# Patient Record
Sex: FEMALE | Race: WHITE | NOT HISPANIC OR LATINO | Employment: OTHER | URBAN - METROPOLITAN AREA
[De-identification: names, ages, dates, MRNs, and addresses within clinical notes are randomized per-mention and may not be internally consistent; named-entity substitution may affect disease eponyms.]

---
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
## 2022-04-27 ENCOUNTER — HOSPITAL ENCOUNTER (EMERGENCY)
Facility: CLINIC | Age: 22
Discharge: HOME | End: 2022-04-28
Attending: EMERGENCY MEDICINE | Admitting: EMERGENCY MEDICINE
Payer: COMMERCIAL

## 2022-04-27 DIAGNOSIS — Z72.89 DELIBERATE SELF-CUTTING: ICD-10-CM

## 2022-04-27 LAB — SARS-COV-2 RNA RESP QL NAA+PROBE: NEGATIVE

## 2022-04-27 PROCEDURE — U0003 INFECTIOUS AGENT DETECTION BY NUCLEIC ACID (DNA OR RNA); SEVERE ACUTE RESPIRATORY SYNDROME CORONAVIRUS 2 (SARS-COV-2) (CORONAVIRUS DISEASE [COVID-19]), AMPLIFIED PROBE TECHNIQUE, MAKING USE OF HIGH THROUGHPUT TECHNOLOGIES AS DESCRIBED BY CMS-2020-01-R: HCPCS | Performed by: EMERGENCY MEDICINE

## 2022-04-27 PROCEDURE — C9803 HOPD COVID-19 SPEC COLLECT: HCPCS

## 2022-04-27 PROCEDURE — 99285 EMERGENCY DEPT VISIT HI MDM: CPT | Mod: 25

## 2022-04-27 PROCEDURE — 90791 PSYCH DIAGNOSTIC EVALUATION: CPT

## 2022-04-27 ASSESSMENT — ENCOUNTER SYMPTOMS: SHORTNESS OF BREATH: 0

## 2022-04-28 VITALS
SYSTOLIC BLOOD PRESSURE: 110 MMHG | OXYGEN SATURATION: 98 % | DIASTOLIC BLOOD PRESSURE: 77 MMHG | HEART RATE: 79 BPM | TEMPERATURE: 98.9 F | RESPIRATION RATE: 16 BRPM

## 2022-04-28 PROCEDURE — 250N000013 HC RX MED GY IP 250 OP 250 PS 637: Performed by: PSYCHIATRY & NEUROLOGY

## 2022-04-28 RX ORDER — LAMOTRIGINE 100 MG/1
200 TABLET ORAL DAILY
Status: DISCONTINUED | OUTPATIENT
Start: 2022-04-28 | End: 2022-04-28 | Stop reason: HOSPADM

## 2022-04-28 RX ADMIN — LAMOTRIGINE 200 MG: 100 TABLET ORAL at 10:09

## 2022-04-28 NOTE — DISCHARGE INSTRUCTIONS
If I am feeling unsafe or I am in a crisis, I will:   Contact my established care providers   Call the National Suicide Prevention Lifeline: 136.298.8247   Go to the nearest emergency room   Call 911      Warning signs that I or other people might notice when a crisis is developing for me: If my mood shifts, feeling anxious or depressed. If I look shut down, minimally responsive, pain.      Things I am able to do on my own to cope or help me feel better: Be artistic, make music, color, draw, engage in self care, shower, do my make up, go for a walk.      Things that I am able to do with others to cope or help me better: Talk on the phone with friends, getting out of the house and going for a walk with friends.      Things I can use or do for distraction: Listen to music, watching YouTube.      Changes I can make to support my mental health and wellness: I need to take my medications everyday and not when I feel like it.       People in my life that I can ask for help: My mom, therapist, other professional providers.      Your [facility] has a mental health crisis team you can call 24/7: [facility] Crisis Line Number: [phone]      Other things that are important when I m in crisis: Listen to what I am asking for, do not try to tell me how to fix my problems.      Other resources: Follow up with my TACT team, reach out to support team.       Appointment information: Continue with established outpatient TACT team and other mental health professionals.      Crisis Lines  Crisis Text Line  Text 778018  You will be connected with a trained live crisis counselor to provide support.    [place] Hotline  1.800333.hope [4673]    National Hope Line  1.800.SUICIDE [9221944]    National Suicide Prevention Lifeline  Free and confidential support  1.800.646.TALK [4333]  http://suicidepreventionlifeline.org    The [employer] (LGBTQ Youth Crisis Line)  6.028.185.1884  text START to 204-429    [facility]  "Line  7.420.500.4127 (Press 1)  or text 730601    Community Resources  Fast Tracker  Linking people to mental health and substance use disorder resources  SocialPandasckCrowdcaren.Sunfire     [place] Mental Health Warm Line  Peer to peer support  Monday thru Saturday, 12 pm to 10 pm  692.709.3380 or 7.307.725.8983  Text \"Support\" to 83600    National [place] on Mental Illness (KIRK)  979.770.4051 or 1.888.KIRK.HELPS    Walk-in Counseling Center  Free mental health counseling  [address] [facility]  533.047.9336    Mental Health Apps  My3  https://Fluoresentric.Sunfire/    VirtualHopeBox  https://Postabon/apps/virtual-hope-box/    Suicide Safety Plan ([employer]mann's)    Calm Harm      Additional information:  Today you were seen by a licensed mental health professional through Triage and Transition services, [facility] ([facility])  for a crisis assessment in the Emergency Department at [facility].  It is recommended that you follow up with your established providers (psychiatrist, mental health therapist, and/or primary care doctor - as relevant) as soon as possible. Coordinators from [facility] will be calling you in the next 24-48 hours to ensure that you have the resources you need.  You can also contact [facility] coordinators directly at [phone]. You may have been scheduled for or offered an appointment with a mental health provider. [facility] maintains an extensive network of licensed behavioral health providers to connect patients with the services they need.  We do not charge providers a fee to participate in our referral network.  We match patients with providers based on a patient's specific needs, insurance coverage, and location.  Our first effort will be to refer you to a provider within your care system, and will utilize providers outside your care system as needed.      "

## 2022-04-28 NOTE — PROGRESS NOTES
Patient agreeable to discharge plan. Discharge instructions reviewed with patient including follow-up care plan. Reviewed safety plan and outpatient resources. Denies SI and HI. All belongings that were brought into the hospital have been returned to patient. Escorted off the unit at 1405 accompanied by [facility] staff. Discharged to home via cab.

## 2022-04-28 NOTE — PROGRESS NOTES
21 year old female with history of bipolar 2, OCD, borderline personality disorder received from ED due to SI. Reports having a panic attack earlier, which she says is what caused her to have thoughts of harming herself. At that time, she used a blade from a pencil sharpener to superficially cut herself on her left forearm, and chest. She currently denies SI, or any thoughts or urges of harming herself. She sees a psychiatrist and therapist outpatient. She's been taking all of her meds as prescribed. Presents as somewhat anxious, flat affect, soft spoken with minimal eye contact. Pleasant and cooperative. Denies SI/HI.     Nursing and risk assessments completed. Assessments reviewed with LM. Video monitoring in progress, patient informed.  Admission information reviewed with patient. Patient given a tour of [facility] and instructions on using the facility. Questions regarding [facility] addressed. Pt search completed and belongings inventoried.

## 2022-04-28 NOTE — ED NOTES
[facility] Discharge Note    Writer met with patient to discuss discharge planning. Reviewed current presentation on the unit and assessed for safety. Patient participated in aftercare and safety planning. The following plan was developed:    If I am feeling unsafe or I am in a crisis, I will:   Contact my established care providers   Call the National Suicide Prevention Lifeline: 791.121.4293   Go to the nearest emergency room   Call 911     Warning signs that I or other people might notice when a crisis is developing for me: If my mood shifts, feeling anxious or depressed. If I look shut down, minimally responsive, pain.     Things I am able to do on my own to cope or help me feel better: Be artistic, make music, color, draw, engage in self care, shower, do my make up, go for a walk.     Things that I am able to do with others to cope or help me better: Talk on the phone with friends, getting out of the house and going for a walk with friends.     Things I can use or do for distraction: Listen to music, watching YouTube.     Changes I can make to support my mental health and wellness: I need to take my medications everyday and not when I feel like it.      People in my life that I can ask for help: My mom, therapist, other professional providers.     Your [facility] has a mental health crisis team you can call 24/7: [facility] Crisis Line Number: [phone]     Other things that are important when I m in crisis: Listen to what I am asking for, do not try to tell me how to fix my problems.     Other resources: Follow up with my TACT team, reach out to support team.      Appointment information: Continue with established outpatient TACT team and other mental health professionals.    Additional information:  Today you were seen by a licensed mental health professional through Triage and Transition services, [facility] ([facility])  for a crisis assessment in the Emergency Department at [facility].   It is recommended that you follow up with your established providers (psychiatrist, mental health therapist, and/or primary care doctor - as relevant) as soon as possible. Coordinators from [facility] will be calling you in the next 24-48 hours to ensure that you have the resources you need.  You can also contact [facility] coordinators directly at [phone]. You may have been scheduled for or offered an appointment with a mental health provider. [facility] maintains an extensive network of licensed behavioral health providers to connect patients with the services they need.  We do not charge providers a fee to participate in our referral network.  We match patients with providers based on a patient's specific needs, insurance coverage, and location.  Our first effort will be to refer you to a provider within your care system, and will utilize providers outside your care system as needed.    Follow up care options were discussed with patient. The plan for aftercare is as follows: continue to follow up with established outpatient mental health providers.

## 2022-04-28 NOTE — ED TRIAGE NOTES
Superficial cuts to left arm and chest. Bleeding controlled.      Triage Assessment     Row Name 04/27/22 2117       Triage Assessment (Adult)    Airway WDL WDL       Respiratory WDL    Respiratory WDL WDL       Skin Circulation/Temperature WDL    Skin Circulation/Temperature WDL WDL       Cardiac WDL    Cardiac WDL WDL       Peripheral/Neurovascular WDL    Peripheral Neurovascular WDL WDL       Cognitive/Neuro/Behavioral WDL    Cognitive/Neuro/Behavioral WDL WDL

## 2022-04-28 NOTE — ED NOTES
"The following information was received from Olya Petra whose relationship to the patient is [facility]  was obtained via phone. Their phone number is # [phone] and they last had contact with patient on 4/11 and last met with her team on 4/22/2022.      How long have you been working with the patient: Since September 2020.     How often do you see them: She shared meeting with Pt 1-2 times a month, sometimes a little bit more. She shared Pt is involved with a TACT team through [employer]. Peer Support and [facility] worker meets with her on a weekly basis.     What is the patient's legal status (Commitment, probation, guardian, etc.): She reports Pt is her own guardian and not on commitment.     How does their current level of functioning compare to baseline: She shared Pt has had panic attacks in the past, yet was unable to get it under control and called 911 for assistance. She shared Pt has called 911 before for mental health concerns. She shared Pt has history of self-harm by cutting and engaged in SIB yesterday before coming in.    Concern about alcohol/drug use? Yes She reports \"alcohol misuse.\" She shared Pt may have 1-2 drinks a night and not take her medication at times. She shared Pt also has been using Delta 8 on and off for a while.     Has the patient made any comments about wanting to kill themselves/others: No - She shared Pt has spoken more regarding having the fear of getting to the point where she may start to think about killing herself and start coming up with plans. She reports Pt has not made comments in recent conversations about having a plan or wanting to kill themselves. She shared Pt has mentioned intrusive thoughts about possibly harming others, yet has not followed through or has a history of aggression towards others.     What is your treatment plan going forward: She shared Pt has an intake in the beginning of May for [facility] " program.    Other information: She shared if Pt was to be discharged she would be going home and her step-father would be there. She shared Pt would have access to support from telehealth and in-person services through her TACT team.

## 2022-04-28 NOTE — CONSULTS
"4/27/2022  Johanne Bahena 2000     [facility] Crisis Assessment    Patient was assessed: in person  Patient location: [facility] ED - EmPATH, consult room A  Was a release of information signed: Yes. Providers included on the release: Luis Arvizu, MS, SHREE, MARCUST at [facility] ([phone])      Referral Data and Chief Complaint  Johanne \"Mahin\" Shruti is a 21 year old patient who identifies as female and uses she/her pronouns. Patient presented to the ED via EMS and was referred to the ED by self. Patient is presenting to the ED for the following concerns: panic attack and suicidal ideation.        Informed Consent and Assessment Methods  Patient is her own guardian. Writer met with patient and explained the crisis assessment process, including applicable information disclosures and limits to confidentiality, assessed understanding of the process, and obtained consent to proceed with the assessment. Patient was observed to be able to participate in the assessment as evidenced by verbalizing understanding of assessment purpose and engaging in interview. Assessment methods included conducting a formal interview with patient, review of medical records, collaboration with medical staff, and obtaining relevant collateral information from family and community providers when available.   **Patient's medical record indicates that her mother is the patient's legal guardian which is NOT correct. Patient clarified that she is her own decision maker. There is no indication on the public court access website that indicates patient is under guardianship of any kind.       Narrative Summary of Presenting Problem and Current Functioning  What led to the patient presenting for crisis services, factors that make the crisis life threatening or complex, stressors, how is this disrupting the patient's life, and how current functioning is in comparison to baseline. How is patient presenting during " "the assessment.     Writer met with patient in consult room A for mental health crisis assessment. Patient came to this interview willingly, engaged appropriately, and was cooperative and pleasant. Patient stated that she called 911 last evening due to a panic attack which led to suicidal thoughts. Patient stated \"I had a really bad panic attack which I guess led to some pretty intense thoughts and feelings like being suicidal. I called the  on myself because I was so freaked out because it was so intense.\" Patient reports that during this panic attack she was hyperventilating, shaking, crying, her chest was tight, she experienced racing thoughts, and thought she would pass out. During this panic attack patient cut herself on her left forearm and her chest with a blade from a pencil sharpener. Patient's left forearm is currently bandaged and patient does have significant scarring from past cutting over what is currently visible of her arms and neck. Patient reports that she had a \"really good\" day yesterday and feels like her panic attack \"came out of nowhere.\" She said that once per panic attack resolved, her suicidal thoughts resolved as well. Patient typically lets herself cry during her panic attacks and rides the wave of her emotions. Patient was unable to identify any trigger for this panic attack, however she did report that earlier in the evening she had half of a seltzer drink, and in the last few days she has not been sleeping well or eating well. Patient has a history of anorexia nervosa and has been restricting her food. She also does not feel her ADD is currently controlled. Patient feels that she is currently in a mixed episode of her bipolar disorder. Patient is set up with outpatient therapy, psychiatry, and case management. Patient is scheduled for an intake for DBT next week at [facility].       History of the Crisis  Duration of the current crisis, coping skills attempted to " reduce the crisis, community resources used, and past presentations.    As noted above, patient reports worsening sleep and appetite over the past few days. Patient does have a significant history of mental health treatment dating back to at least 2015. She has several inpatient psychiatric hospitalizations, ED visits, and has participated in outpatient programming as well as residential treatment. Patient has also received eating disorder treatment in the past at [facility].       Collateral Information  Attempted to obtain collateral information from patient's stepfather Mack ([phone]) but this phone call was not answered. Voicemail was left requesting a return call.       Risk Assessment    Risk of Harm to Self     ESS-6  1.a. Over the past 2 weeks, have you had thoughts of killing yourself? Yes  1.b. Have you ever attempted to kill yourself and, if yes, when did this last happen? Yes most recently last summer via cutting forearm.    2. Recent or current suicide plan? No   3. Recent or current intent to act on ideation? No  4. Lifetime psychiatric hospitalization? Yes  5. Pattern of excessive substance use? No  6. Current irritability, agitation, or aggression? No  Scoring note: BOTH 1a and 1b must be yes for it to score 1 point, if both are not yes it is zero. All others are 1 point per number. If all questions 1a/1b - 6 are no, risk is negligible. If one of 1a/1b is yes, then risk is mild. If either question 2 or 3, but not both, is yes, then risk is automatically moderate regardless of total score. If both 2 and 3 are yes, risk is automatically high regardless of total score.     Score: 3, high risk    The patient has the following risk factors for suicide: depressive symptoms, family member or friend completed suicide and prior suicide attempt    Is the patient experiencing current suicidal ideation: No    Is the patient engaging in preparatory suicide behaviors (formulating how to act on plan,  "giving away possessions, saying goodbye, displaying dramatic behavior changes, etc)? No    Does the patient have access to firearms or other lethal means? no    The patient has the following protective factors: voluntarily seeking mental health support, displays resiliency , established relationship community mental health provider(s), future focused thinking, displays insight, expresses desire to engage in treatment, sense of obligation to people/pets and safe/stable housing    Support system information: \"My mom.\"     Patient strengths: \"I'm outgoing and artistic.\"     Does the patient engage in non-suicidal self-injurious behavior (NSSI/SIB)? yes. Method: cutting, Frequency: frequent, Duration: unknown, History: unknown.     Is the patient vulnerable to sexual exploitation?  No    Is the patient experiencing abuse or neglect? no    Is the patient a vulnerable adult? No    Risk of Harm to Others  The patient has the following risk factors of harm to others: no risk factors identified    Does the patient have thoughts of harming others? No    Is the patient engaging in sexually inappropriate behavior?  no       Current Substance Abuse    Is there recent substance abuse?   Substance type(s): Alcohol - hard seltzer, Frequency: once per week, Quantity: half of one hard seltzer drink, Method: oral, Duration: unknown, Last use: 4/27/2022.     Was a urine drug screen or blood alcohol level obtained: No    CAGE AID  Have you felt you ought to cut down on your drinking or drug use?  No  Have people annoyed you by criticizing your drinking or drug use? No  Have you felt bad or guilty about your drinking or drug use? No  Have you ever had a drink or used drugs first thing in the morning to steady your nerves or to get rid of a hangover? No  Score: 0/4       Current Symptoms/Concerns    Symptoms  Attention, hyperactivity, and impulsivity symptoms present: Yes: Disorganized/Forgetful, Hyperactive, Impulsive, Inattentive, " Restless and Other: hyperfixations, sensory sensitive    Anxiety symptoms present: Yes: Panic attacks and Generalized Symptoms: Cognitive anxiety - feelings of doom, racing thoughts, difficulty concentrating  and Physiological anxiety - sweating, flushing, shaking, shortness of breath, or racing heart      Appetite symptoms present: Yes: Increase in Appetite     Behavioral difficulties present: No     Cognitive impairment symptoms present: No    Depressive symptoms present: Yes Crying or feels like crying, Depressed mood, Loss of interest / Anhedonia , Low self esteem , Sleep disturbance  and Thoughts of suicide/death      Eating disorder symptoms present: Yes: Restricting    Learning disabilities, cognitive challenges, and/or developmental disorder symptoms present: Yes: Functional Impairments - ADHD    Manic/hypomanic symptoms present: No    Personality and interpersonal functioning difficulties present : No    Psychosis symptoms present: No      Sleep difficulties present: Yes: Difficulty falling asleep  and Difficulty staying sleep     Substance abuse disorder symptoms present: No     Trauma and stressor related symptoms present: No     Mental Status Exam  Affect: Appropriate   Appearance: Disheveled due to being interrupted in sleep  Attention Span/Concentration: Attentive?    Eye Contact: Engaged   Fund of Knowledge: Appropriate    Language /Speech Content: Fluent   Language /Speech Volume: Normal    Language /Speech Rate/Productions: Normal    Recent Memory: Intact   Remote Memory: Intact   Mood: Normal    Orientation to Person: Yes    Orientation to Place: Yes   Orientation to Time of Day: Yes    Orientation to Date: Yes    Situation (Do they understand why they are here?): Yes    Psychomotor Behavior: Normal    Thought Content: Clear   Thought Form: Intact       Mental Health and Substance Abuse History    History  Current and historical diagnoses or mental health concerns: Patient has a history of bipolar  "2 disorder, borderline personality disorder, anxiety, depression, obsessive compulsive disorder, panic disorder, suicidal ideation, anorexia nervosa, attention deficit disorder, and \"psychophysiological insomnia.\"     Prior MH services (inpatient, programmatic care, outpatient, etc): Yes, outpatient and inpatient care.     Has the patient used [facility] crisis team services before?: Unknown.     History of substance abuse: No    Prior AMANDA services (inpatient, programmatic care, detox, outpatient, etc): No    History of commitment: No    Family history of MH/AMANDA: Yes, mother and father both with depression, cousin with schizoaffective disorder, cousin with ADHD, and two cousins with depression. Father  by suicide in .     Trauma history: Yes, history of bullying.     Medication  Psychotropic medications: Yes. Pt is currently taking (see MAR). Medication compliant: Yes. Recent medication changes: Yes recent addition of propanolol.    No current facility-administered medications for this encounter.     Current Outpatient Medications   Medication     albuterol (PROAIR HFA) 108 (90 Base) MCG/ACT inhaler     cariprazine (VRAYLAR) 3 MG CAPS capsule     Citalopram Hydrobromide (CELEXA PO)     clonazePAM (KLONOPIN) 0.5 MG tablet     hydrOXYzine (ATARAX) 50 MG tablet     lamoTRIgine (LAMICTAL) 200 MG tablet     levothyroxine (SYNTHROID/LEVOTHROID) 50 MCG tablet     lithium (ESKALITH CR/LITHOBID) 450 MG CR tablet     lithium ER (LITHOBID/ESKALITH CR) 300 MG CR tablet     LORazepam (ATIVAN PO)     MELATONIN PO     Mirtazapine (REMERON PO)     SUMAtriptan Succinate (IMITREX PO)     Current Care Team  Primary Care Provider: Dr. Harriet Montano, [facility] ([phone])    Psychiatrist: Dr. Valencia Martinez at [facility] ([phone])    Therapist: Luis Arvizu, MS, LMFT, CSAT at [facility] ([phone])    Mental Health : Olya Davey at [facility] ([phone])    Peer Support: name unknown at [facility]    Dialectical Behavioral Therapy: Intake is scheduled next week at [facility].     Biopsychosocial Information    Socioeconomic Information  Current living situation: Lives with mother, aline, and 18 year old brother in [place].     Employment/income source: Unemployed. Patient receives disability.     Relevant legal issues: None.     Cultural, Voodoo, or spiritual influences on mental health care: None.     Is the patient active in the  or a : None.     Relevant Medical Concerns   Patient identifies concerns with completing ADLs? No     Patient can ambulate independently? Yes     Other medical concerns? None.     History of concussion or TBI? No        Diagnosis  F31.9 Unspecified Bipolar and Related Disorder - primary  F41.0 Panic Disorder - by history  F32.9 Unspecified Depressive Disorder - by history  F41.9 Unspecified Anxiety Disorder - by history  F60.3 Borderline Personality Disorder - by history  F42 Unspecified Obsessive Compulsive Disorder - by history  F50.01 Anorexia Nervosa, restricting type - by history  F90.9 Unspecified Attention-Deficit/Hyperactivity Disorder - by history      Therapeutic Intervention  The following therapeutic methodologies were employed when working with the patient: establishing rapport, active listening, assessing dimensions of crisis, solution focused brief therapy, identifying additional supports and alternative coping skills, DBT skills and treatment planning. Patient response to intervention: cooperative, engaged, pleasant.      Disposition  Recommended disposition: The patient's paper medical record is not available during this visit. It has been removed from this office and cannot be located.    Reviewed case and recommendations with attending provider: No, consult is still in process at the time of writing this note. Final disposition will be updated by  staff after review with the attending provider.    Attending concurs with disposition: N/A    Patient concurs with disposition: Yes      Guardian concurs with disposition: N/A    Final disposition: Remain in emPATH overnight for observation and reassess next shift.      Clinical Substantiation of Recommendations   Rationale with supporting factors for disposition and diagnosis.     A lower level of care has been unsuccessful in treating and stabilizing patient s mental health symptoms. Patient will remain on EmPATH unit under observation for continued monitoring, treatment and therapeutic intervention of mental health symptoms. Observation at [facility] could help mitigate the need for a more restrictive level of care in an inpatient setting.         Assessment Details  Patient interview started at: 2:20AM and completed at: 2:50AM.    Total duration spent on the patient case in minutes: .50 hrs     CPT code(s) utilized: 97069 - Psychotherapy for Crisis - 60 (30-74*) min           MATHIEU Moreno

## 2022-04-28 NOTE — TREATMENT PLAN
EmPATH Treatment Plan    Client's Name: Johanne TIERNEY [facility]  YOB: 2000    DSM-5 Diagnoses:   F31.9 Unspecified Bipolar and Related Disorder - primary  F41.0 Panic Disorder - by history  F32.9 Unspecified Depressive Disorder - by history  F41.9 Unspecified Anxiety Disorder - by history  F60.3 Borderline Personality Disorder - by history  F42 Unspecified Obsessive Compulsive Disorder - by history  F50.01 Anorexia Nervosa, restricting type - by history  F90.9 Unspecified Attention-Deficit/Hyperactivity Disorder - by history    Psychosocial / Contextual Factors: Patient presented to the emPATH unit with the following concerns: Remain in emPATH overnight for observation and reassess next shift.    Anticipated number of sessions or this episode of care: 1-4      MeasurableTreatment Goal(s) related to diagnosis / functional impairment(s)    Goal : Patient will increase coping skills to decrease self-injurious behavior (SIB).  Objective #A  Patient will identify SIB triggers.  Intervention(s)  LMHP will explore and process with patient.  Objective #B  Patient will identify at least 3 skills they can use to address SIB urges.  Intervention(s)  LMHP will teach DBT skills for SIB urges.           Goal: Patient will identify anxiety triggers and maladaptive responses to them.   Objective #A  Patient will identify situations, thoughts, and behaviors that trigger anxiety.  Intervention(s)  LMHP will facilitate guided discussion.  Objective #B  Patient will identify maladaptive responses to anxiety and develop at least 2 alternative strategies for coping.  Intervention(s)  LMHP will provide psychoeducation and modeling.      Mental Status Exam  Affect: Appropriate   Appearance: Disheveled due to being interrupted in sleep  Attention Span/Concentration: Attentive?    Eye Contact: Engaged   Fund of Knowledge: Appropriate    Language /Speech Content: Fluent   Language /Speech Volume: Normal    Language /Speech  Rate/Productions: Normal    Recent Memory: Intact   Remote Memory: Intact   Mood: Normal    Orientation to Person: Yes    Orientation to Place: Yes   Orientation to Time of Day: Yes    Orientation to Date: Yes    Situation (Do they understand why they are here?): Yes    Psychomotor Behavior: Normal    Thought Content: Clear  Thought Form: Intact       PLAN:   Patient will board in emPATH until patient and treatment deem it appropriate to either discharge or admit to a higher level of care. Details: Remain in emPATH overnight for observation and reassess next shift.      Nydia Walsh, LICSW

## 2022-04-28 NOTE — ED TRIAGE NOTES
patient lives at home with family. Step dad called 911 due patient cutting her left arm and chest with a razor from a pencil sharpner in an attempt to harm herself. Patient has legal guardian- her mother who is out of town., patient's step dad Mack can be reached at [phone]. Patient on  hold.

## 2022-04-28 NOTE — ED PROVIDER NOTES
History   Chief Complaint:  Suicidal     The history is provided by the patient and medical records.      Johanne Bahena is a 21 year old female with history of bipolar 2 disorder, borderline personality disorder, obsessive compulsive disorder, and hypothyroidism who presents via EMS with suicidal ideation. The patient states she had a panic attack today and wanted to kill herself. She endorses self harm and states she cut her left forearm and chest. She denies any current suicidal ideation and believes her self harm was due to her panic attack. She states she feels safe at home. She denies chest pain or shortness of breath. She states she has been compliant with her daily medications.     Review of Systems   Respiratory: Negative for shortness of breath.    Cardiovascular: Negative for chest pain.   Psychiatric/Behavioral: Positive for self-injury. Negative for suicidal ideas.   All other systems reviewed and are negative.    Allergies:  The patient has no known allergies.     Medications:  Albuterol inhaler  Vraylar  Celexa  Klonopin  Atarax  Lamictal  Synthroid   Lithium  Ativan   Remeron  Imitrex    Past Medical History:     Anxiety  Migraines     Anemia  Borderline personality disorder  Vitamin D deficiency  Anorexia nervosa   Bipolar 2 disorder  ADD  Psychophysiological insomnia   Hypothyroidism due to Hashimoto's thyroiditis   Depression   Obsessive compulsive disorder     Past Surgical History:    [place] teeth extraction    Labioplasty     Family History:    Father - Depression     Social History:  The patient arrived to the emergency department via EMS.    Physical Exam     Patient Vitals for the past 24 hrs:   BP Temp Temp src Pulse Resp SpO2   04/27/22 2122 118/78 98.3  F (36.8  C) Temporal 83 15 99 %     Physical Exam  General: alert, lying comfortably on gurney  HENT: mucous membranes moist  CV: regular rate, regular rhythm  Resp: normal effort, clear throughout, no crackles or wheezing  GI: abdomen  soft and nontender, no guarding  MSK: no bony tenderness  Skin: appropriately warm and dry.  Superficial lacerations too numerous to count involving the left volar forearm.  Extremities: no edema, calves non-tender  Neuro: alert, clear speech, oriented  Psych: normal mood and affect    Emergency Department Course   Laboratory:  Labs Ordered and Resulted from Time of ED Arrival to Time of ED Departure   COVID-19 VIRUS (CORONAVIRUS) BY PCR - Normal       Result Value    SARS CoV2 PCR Negative        Emergency Department Course:    Mental Health Risk Assessment      PSS-3    Date and Time Over the past 2 weeks have you felt down, depressed, or hopeless? Over the past 2 weeks have you had thoughts of killing yourself? Have you ever attempted to kill yourself? When did this last happen? User   04/27/22 2119 yes yes yes more than 6 months ago [facility]      C-SSRS ([place])    Date and Time Q1 Wished to be Dead (Past Month) Q2 Suicidal Thoughts (Past Month) Q3 Suicidal Thought Method Q4 Suicidal Intent without Specific Plan Q5 Suicide Intent with Specific Plan Q6 Suicide Behavior (Lifetime) Within the Past 3 Months? RETIRED: Level of Risk per Screen Screening Not Complete User   04/27/22 2119 yes yes yes no yes yes -- -- -- [facility]              Suicide assessment completed by mental health (D.E.C., LCSW, etc.)    Reviewed:  I reviewed nursing notes, vitals, past medical history and Care Everywhere    Assessments:  2245 I obtained history and examined the patient as noted above.     Disposition:  The patient was transferred to [facility].     Impression & Plan   Medical Decision Making:  Johanne Bahena is a 21 year old female who presents for evaluation of anxiety and self-harm.  On exam, she has multiple superficial lacerations to the left forearm.  Wounds are not amenable to repair.  She denies suicidal ideation.  There are no concerns for or signs at this point of suicide attempt or ingestion, no concerning findings on the remainder  of physical exam. The patient is medically cleared and deemed a good candidate for EmPATH for further psychiatric evaluation and care.  She was in agreement and was transferred to the EmPATH unit in stable condition.     Diagnosis:    ICD-10-CM    1. Deliberate self-cutting  Z72.89      Scribe Disclosure:  I, Kimmie Sheridan, am serving as a scribe at 9:58 PM on 4/27/2022 to document services personally performed by Octavia Sepulveda MD based on my observations and the provider's statements to me.     Octavia Sepulveda MD  04/28/22 0246